# Patient Record
Sex: MALE | Race: OTHER | Employment: FULL TIME | URBAN - METROPOLITAN AREA
[De-identification: names, ages, dates, MRNs, and addresses within clinical notes are randomized per-mention and may not be internally consistent; named-entity substitution may affect disease eponyms.]

---

## 2021-08-10 ENCOUNTER — HOSPITAL ENCOUNTER (EMERGENCY)
Age: 61
Discharge: HOME | End: 2021-08-10
Attending: STUDENT IN AN ORGANIZED HEALTH CARE EDUCATION/TRAINING PROGRAM
Payer: COMMERCIAL

## 2021-08-10 VITALS
HEART RATE: 62 BPM | TEMPERATURE: 97.9 F | OXYGEN SATURATION: 97 % | RESPIRATION RATE: 22 BRPM | SYSTOLIC BLOOD PRESSURE: 124 MMHG | DIASTOLIC BLOOD PRESSURE: 64 MMHG

## 2021-08-10 DIAGNOSIS — N41.0 ACUTE PROSTATITIS: Primary | ICD-10-CM

## 2021-08-10 LAB
ALBUMIN SERPL-MCNC: 3.4 G/DL (ref 3.2–4.6)
ALBUMIN/GLOB SERPL: 0.7 {RATIO} (ref 1.2–3.5)
ALP SERPL-CCNC: 82 U/L (ref 50–136)
ALT SERPL-CCNC: 60 U/L (ref 12–65)
ANION GAP SERPL CALC-SCNC: 9 MMOL/L (ref 7–16)
APPEARANCE UR: CLEAR
AST SERPL-CCNC: 17 U/L (ref 15–37)
BASOPHILS # BLD: 0.1 K/UL (ref 0–0.2)
BASOPHILS NFR BLD: 0 % (ref 0–2)
BILIRUB SERPL-MCNC: 0.6 MG/DL (ref 0.2–1.1)
BILIRUB UR QL: NEGATIVE
BUN SERPL-MCNC: 12 MG/DL (ref 8–23)
CALCIUM SERPL-MCNC: 9.5 MG/DL (ref 8.3–10.4)
CHLORIDE SERPL-SCNC: 101 MMOL/L (ref 98–107)
CO2 SERPL-SCNC: 24 MMOL/L (ref 21–32)
COLOR UR: YELLOW
CREAT SERPL-MCNC: 0.79 MG/DL (ref 0.8–1.5)
DIFFERENTIAL METHOD BLD: ABNORMAL
EOSINOPHIL # BLD: 0.1 K/UL (ref 0–0.8)
EOSINOPHIL NFR BLD: 1 % (ref 0.5–7.8)
ERYTHROCYTE [DISTWIDTH] IN BLOOD BY AUTOMATED COUNT: 14 % (ref 11.9–14.6)
GLOBULIN SER CALC-MCNC: 4.7 G/DL (ref 2.3–3.5)
GLUCOSE SERPL-MCNC: 154 MG/DL (ref 65–100)
GLUCOSE UR STRIP.AUTO-MCNC: >1000 MG/DL
HCT VFR BLD AUTO: 40.4 % (ref 41.1–50.3)
HGB BLD-MCNC: 13.4 G/DL (ref 13.6–17.2)
HGB UR QL STRIP: NEGATIVE
IMM GRANULOCYTES # BLD AUTO: 0.2 K/UL (ref 0–0.5)
IMM GRANULOCYTES NFR BLD AUTO: 1 % (ref 0–5)
KETONES UR QL STRIP.AUTO: NEGATIVE MG/DL
LEUKOCYTE ESTERASE UR QL STRIP.AUTO: NEGATIVE
LYMPHOCYTES # BLD: 1.8 K/UL (ref 0.5–4.6)
LYMPHOCYTES NFR BLD: 11 % (ref 13–44)
MCH RBC QN AUTO: 27.3 PG (ref 26.1–32.9)
MCHC RBC AUTO-ENTMCNC: 33.2 G/DL (ref 31.4–35)
MCV RBC AUTO: 82.4 FL (ref 79.6–97.8)
MONOCYTES # BLD: 1.1 K/UL (ref 0.1–1.3)
MONOCYTES NFR BLD: 7 % (ref 4–12)
NEUTS SEG # BLD: 12.2 K/UL (ref 1.7–8.2)
NEUTS SEG NFR BLD: 80 % (ref 43–78)
NITRITE UR QL STRIP.AUTO: NEGATIVE
NRBC # BLD: 0 K/UL (ref 0–0.2)
PH UR STRIP: 5.5 [PH] (ref 5–9)
PLATELET # BLD AUTO: 430 K/UL (ref 150–450)
PMV BLD AUTO: 8.9 FL (ref 9.4–12.3)
POTASSIUM SERPL-SCNC: 3.8 MMOL/L (ref 3.5–5.1)
PROT SERPL-MCNC: 8.1 G/DL (ref 6.3–8.2)
PROT UR STRIP-MCNC: NEGATIVE MG/DL
RBC # BLD AUTO: 4.9 M/UL (ref 4.23–5.6)
SODIUM SERPL-SCNC: 134 MMOL/L (ref 136–145)
SP GR UR REFRACTOMETRY: 1.03 (ref 1–1.02)
UROBILINOGEN UR QL STRIP.AUTO: 0.2 EU/DL (ref 0.2–1)
WBC # BLD AUTO: 15.3 K/UL (ref 4.3–11.1)

## 2021-08-10 PROCEDURE — 74011000250 HC RX REV CODE- 250

## 2021-08-10 PROCEDURE — 80053 COMPREHEN METABOLIC PANEL: CPT

## 2021-08-10 PROCEDURE — 85025 COMPLETE CBC W/AUTO DIFF WBC: CPT

## 2021-08-10 PROCEDURE — 81003 URINALYSIS AUTO W/O SCOPE: CPT

## 2021-08-10 PROCEDURE — 99284 EMERGENCY DEPT VISIT MOD MDM: CPT

## 2021-08-10 RX ORDER — LIDOCAINE HYDROCHLORIDE 20 MG/ML
JELLY TOPICAL
Status: COMPLETED
Start: 2021-08-10 | End: 2021-08-10

## 2021-08-10 RX ADMIN — LIDOCAINE HYDROCHLORIDE: 20 JELLY TOPICAL at 20:32

## 2021-08-10 NOTE — ED TRIAGE NOTES
Pt sent from md [facility] they were not able to pass the hannon and pt is only able to dribble urine and is in pain currently

## 2021-08-11 NOTE — ED PROVIDER NOTES
56-year-old male presents to the emergency department being sent here by emergencyMD for difficulty urinating. Paperwork from emergency MD was reviewed. Patient was seen earlier today, initial complaining of difficulty urinating for the past 1 week. Patient had Martini catheter placed which only produced approximately 5 cc of urine. CT scan patient's abdomen pelvis showed prostatitis, given IV Rocephin and placed on Cipro for 4 weeks. Patient returned to emergency MD later today with worsening pain, at that time he was advised to go directly to the emergency department. Patient ports continued pain and discomfort to his abdomen. Reports normal bowel movements, denies nausea, vomiting diarrhea. Denies fever or chills. Past Medical History:   Diagnosis Date    Family history of malignant neoplasm of gastrointestinal tract     aunt colon cancer age 80    Mixed hyperlipidemia     Personal history of colonic polyps 2015    adenomas    UTI (urinary tract infection) 1/2014       Past Surgical History:   Procedure Laterality Date    HX COLONOSCOPY  2009    Normal. Done in [place].  HX COLONOSCOPY  last 9/18/15    Carlo--cecal and trans TAs--5 year recall         Family History:   Problem Relation Age of Onset    High Cholesterol Maternal Uncle     High Cholesterol Mother        Social History     Socioeconomic History    Marital status:      Spouse name: Not on file    Number of children: Not on file    Years of education: Not on file    Highest education level: Not on file   Occupational History    Not on file   Tobacco Use    Smoking status: Never Smoker    Smokeless tobacco: Never Used   Substance and Sexual Activity    Alcohol use: Yes     Alcohol/week: 1.7 standard drinks     Types: 2 Cans of beer per week     Comment: Moderate - 2 beers and couple glasses of wine per week.     Drug use: No    Sexual activity: Not on file   Other Topics Concern    Not on file Social History Narrative    Not on file     Social Determinants of Health     Financial Resource Strain:     Difficulty of Paying Living Expenses:    Food Insecurity:     Worried About Running Out of Food in the Last Year:     [address] in the Last Year:    Transportation Needs:     Lack of Transportation (Medical):  Lack of Transportation (Non-Medical):    Physical Activity:     Days of Exercise per Week:     Minutes of Exercise per Session:    Stress:     Feeling of Stress :    Social Connections:     Frequency of Communication with Friends and Family:     Frequency of Social Gatherings with Friends and Family:     Attends [employer] Services:     Active Member of Clubs or Organizations:     Attends Club or Organization Meetings:     Marital Status:    Intimate Partner Violence:     Fear of Current or Ex-Partner:     Emotionally Abused:     Physically Abused:     Sexually Abused: ALLERGIES: Patient has no known allergies. Review of Systems   Constitutional: Negative for chills and fever. HENT: Negative for congestion and sore throat. Eyes: Negative for visual disturbance. Respiratory: Negative for cough and shortness of breath. Cardiovascular: Negative for chest pain. Gastrointestinal: Positive for abdominal distention and abdominal pain. Negative for diarrhea, nausea and vomiting. Endocrine: Negative for polyuria. Genitourinary: Positive for difficulty urinating and dysuria. Musculoskeletal: Negative for neck pain and neck stiffness. Skin: Negative for rash. Neurological: Negative for weakness and headaches. All other systems reviewed and are negative. Vitals:    08/10/21 [birth year]   BP: (!) 184/98    Pulse: 82    Resp:  22   Temp:  97.9 °F (36.6 °C)   SpO2: 97%             Physical Exam  Vitals and nursing note reviewed. Constitutional:       General: He is in acute distress. Appearance: Normal appearance.    HENT:      Head: Normocephalic and atraumatic. Nose: Nose normal.      Mouth/Throat:      Mouth: Mucous membranes are moist.   Eyes:      Extraocular Movements: Extraocular movements intact. Cardiovascular:      Rate and Rhythm: Normal rate and regular rhythm. Heart sounds: Normal heart sounds. Pulmonary:      Effort: Pulmonary effort is normal.      Breath sounds: Normal breath sounds. No wheezing, rhonchi or rales. Abdominal:      General: Abdomen is flat. There is distension. Palpations: Abdomen is soft. Tenderness: There is abdominal tenderness. There is no guarding. Comments: Tenderness greatest in the suprapubic region. Musculoskeletal:         General: Normal range of motion. Cervical back: Normal range of motion. Skin:     General: Skin is warm and dry. Neurological:      General: No focal deficit present. Mental Status: He is alert and oriented to person, place, and time. Psychiatric:         Mood and Affect: Mood normal.          MDM  Number of Diagnoses or Management Options  Acute prostatitis  Diagnosis management comments: 76-year-old male presents to the emergency department being seen twice today at local urgent care. Patient was given IV Rocephin as well as a prescription for Cipro. Diagnosed via CT scan with prostatitis. Patient with difficulty urinating complaint of continued lower abdominal discomfort, will place Martini catheter here in the emergency department. Bladder scan shows greater than 200 cc but patient unable to produce urine. Martini catheter successfully placed by nursing staff, afterwards patient with complete resolution of symptoms. Urinalysis shows no evidence of urinary tract infection. Labs resulted showing white count 15.3, stable H&H, normal electrolytes, normal kidney function, normal liver enzymes. We will leave Martini catheter in place given his continued requirement of catheter in place to urinate.   Patient will take Flomax as well as ciprofloxacin as previously prescribed by emergency MD.  Patient given urology follow-up and call to call tomorrow to make appointment. Patient given strict return precautions. He voiced understanding and agreement with this plan.        Amount and/or Complexity of Data Reviewed  Clinical lab tests: ordered and reviewed  Tests in the radiology section of CPT®: reviewed and ordered  Decide to obtain previous medical records or to obtain history from someone other than the patient: yes  Review and summarize past medical records: yes    Risk of Complications, Morbidity, and/or Mortality  Presenting problems: moderate  Diagnostic procedures: moderate  Management options: moderate           Procedures

## 2021-08-11 NOTE — DISCHARGE INSTRUCTIONS
Leave Martini catheter in place until seen by urology. Take Cipro as previously prescribed, also take Flomax. Return to the ER for worsening or worrisome symptoms.

## 2021-08-11 NOTE — ED NOTES
I have reviewed discharge instructions with the patient. The patient verbalized understanding. Patient left ED via Discharge Method: ambulatory to Home with spouse. Opportunity for questions and clarification provided. Patient given 0 scripts. To continue your aftercare when you leave the hospital, you may receive an automated call from our care team to check in on how you are doing. This is a free service and part of our promise to provide the best care and service to meet your aftercare needs.  If you have questions, or wish to unsubscribe from this service please call [phone]. Thank you for Choosing our [address] [place] [address] Emergency Department.

## 2021-08-12 ENCOUNTER — HOSPITAL ENCOUNTER (EMERGENCY)
Age: 61
Discharge: HOME | End: 2021-08-12
Attending: EMERGENCY MEDICINE
Payer: COMMERCIAL

## 2021-08-12 VITALS
WEIGHT: 170 LBS | HEART RATE: 80 BPM | DIASTOLIC BLOOD PRESSURE: 77 MMHG | OXYGEN SATURATION: 97 % | SYSTOLIC BLOOD PRESSURE: 132 MMHG | BODY MASS INDEX: 26.68 KG/M2 | TEMPERATURE: 98 F | HEIGHT: 67 IN | RESPIRATION RATE: 16 BRPM

## 2021-08-12 DIAGNOSIS — N41.0 ACUTE PROSTATITIS: Primary | ICD-10-CM

## 2021-08-12 DIAGNOSIS — T83.9XXA FOLEY CATHETER PROBLEM, INITIAL ENCOUNTER (HCC): ICD-10-CM

## 2021-08-12 PROCEDURE — 99282 EMERGENCY DEPT VISIT SF MDM: CPT

## 2021-08-12 RX ORDER — HYDROCODONE BITARTRATE AND ACETAMINOPHEN 5; 325 MG/1; MG/1
1-2 TABLET ORAL
Qty: 19 TABLET | Refills: 0 | Status: SHIPPED | OUTPATIENT
Start: 2021-08-12 | End: 2021-08-17

## 2021-08-12 RX ORDER — SULFAMETHOXAZOLE AND TRIMETHOPRIM 800; 160 MG/1; MG/1
TABLET ORAL 2 TIMES DAILY
COMMUNITY
Start: 2021-08-09 | End: 2021-08-20

## 2021-08-12 RX ORDER — DOCUSATE SODIUM 100 MG/1
100 CAPSULE, LIQUID FILLED ORAL 2 TIMES DAILY
Qty: 60 CAPSULE | Refills: 0 | Status: SHIPPED | OUTPATIENT
Start: 2021-08-12 | End: 2021-08-12 | Stop reason: SDUPTHER

## 2021-08-12 RX ORDER — TOLTERODINE TARTRATE 2 MG/1
2 TABLET, EXTENDED RELEASE ORAL DAILY
COMMUNITY
Start: 2021-01-27

## 2021-08-12 RX ORDER — TRIAMCINOLONE ACETONIDE 1 MG/G
CREAM TOPICAL 2 TIMES DAILY
COMMUNITY
Start: 2021-07-11 | End: 2022-02-17

## 2021-08-12 RX ORDER — TAMSULOSIN HYDROCHLORIDE 0.4 MG/1
CAPSULE ORAL
COMMUNITY
Start: 2021-08-10 | End: 2021-09-01 | Stop reason: SDUPTHER

## 2021-08-12 RX ORDER — SILDENAFIL 100 MG/1
100 TABLET, FILM COATED ORAL AS NEEDED
COMMUNITY
End: 2022-02-15

## 2021-08-12 RX ORDER — HYDROCODONE BITARTRATE AND ACETAMINOPHEN 5; 325 MG/1; MG/1
1-2 TABLET ORAL
Qty: 19 TABLET | Refills: 0 | Status: SHIPPED | OUTPATIENT
Start: 2021-08-12 | End: 2021-08-12 | Stop reason: SDUPTHER

## 2021-08-12 RX ORDER — DOCUSATE SODIUM 100 MG/1
100 CAPSULE, LIQUID FILLED ORAL 2 TIMES DAILY
Qty: 60 CAPSULE | Refills: 0 | Status: SHIPPED | OUTPATIENT
Start: 2021-08-12 | End: 2021-09-11

## 2021-08-12 NOTE — ED TRIAGE NOTES
Pt in states he was seen 2 days ago and had catheter placed for urinary retention. States his catheter is not draining well and he is having purulent discharge from penis. States lower abdominal pain denies n/v/d/fever. States called urology and was told to go to ed.

## 2021-08-12 NOTE — DISCHARGE INSTRUCTIONS
Continue ciprofloxacin  Continue Flomax  Take pain medication as needed  Do not drink alcohol or drive while taking the prescription pain medications  Take stool softener as prescribed  Call the urologist in the morning to follow-up  Return to ER for any worsening symptoms or new problems which may arise

## 2021-08-12 NOTE — ED NOTES
56year old male who presents for bladder pressure, decreased UOP, and purulent drainage from around urinary catheter. Catheter placed 8/10/2021 due to urinary retention. VSS in triage. Patient appears uncomfortable but is in no acute distress. Patient evaluated initially in triage. Rapid Medical Evaluation was conducted and necessary orders have been placed. I have performed a medical screening exam.  Care will now be transferred to the attending physician in the emergency department.   Edie Garcia NP 4:04 PM

## 2021-08-12 NOTE — ED NOTES
I have reviewed discharge instructions with the patient. The patient verbalized understanding. Patient left ED via Discharge Method: ambulatory to Home by self. Opportunity for questions and clarification provided. Patient given 2 scripts. To continue your aftercare when you leave the hospital, you may receive an automated call from our care team to check in on how you are doing. This is a free service and part of our promise to provide the best care and service to meet your aftercare needs.  If you have questions, or wish to unsubscribe from this service please call [phone]. Thank you for Choosing our [facility] Emergency Department.      2

## 2021-08-16 ENCOUNTER — HOSPITAL ENCOUNTER (EMERGENCY)
Age: 61
Discharge: HOME | End: 2021-08-16
Attending: EMERGENCY MEDICINE
Payer: COMMERCIAL

## 2021-08-16 VITALS
BODY MASS INDEX: 29.02 KG/M2 | TEMPERATURE: 98.6 F | WEIGHT: 170 LBS | SYSTOLIC BLOOD PRESSURE: 132 MMHG | HEIGHT: 64 IN | RESPIRATION RATE: 18 BRPM | HEART RATE: 61 BPM | OXYGEN SATURATION: 95 % | DIASTOLIC BLOOD PRESSURE: 63 MMHG

## 2021-08-16 DIAGNOSIS — T83.011A MALFUNCTION OF FOLEY CATHETER, INITIAL ENCOUNTER (HCC): Primary | ICD-10-CM

## 2021-08-16 LAB
BACTERIA URNS QL MICRO: NORMAL /HPF
CASTS URNS QL MICRO: NORMAL /LPF
EPI CELLS #/AREA URNS HPF: NORMAL /HPF
RBC #/AREA URNS HPF: NORMAL /HPF
WBC URNS QL MICRO: NORMAL /HPF

## 2021-08-16 PROCEDURE — 87086 URINE CULTURE/COLONY COUNT: CPT

## 2021-08-16 PROCEDURE — 81003 URINALYSIS AUTO W/O SCOPE: CPT

## 2021-08-16 PROCEDURE — 51702 INSERT TEMP BLADDER CATH: CPT

## 2021-08-16 PROCEDURE — 81015 MICROSCOPIC EXAM OF URINE: CPT

## 2021-08-16 PROCEDURE — 96372 THER/PROPH/DIAG INJ SC/IM: CPT

## 2021-08-16 PROCEDURE — 74011000250 HC RX REV CODE- 250: Performed by: EMERGENCY MEDICINE

## 2021-08-16 PROCEDURE — 99284 EMERGENCY DEPT VISIT MOD MDM: CPT

## 2021-08-16 PROCEDURE — 74011250636 HC RX REV CODE- 250/636: Performed by: EMERGENCY MEDICINE

## 2021-08-16 RX ORDER — TRAMADOL HYDROCHLORIDE 50 MG/1
50 TABLET ORAL
Qty: 11 TABLET | Refills: 0 | Status: SHIPPED | OUTPATIENT
Start: 2021-08-16 | End: 2021-08-20

## 2021-08-16 RX ORDER — CEFDINIR 300 MG/1
300 CAPSULE ORAL 2 TIMES DAILY
Qty: 14 CAPSULE | Refills: 0 | Status: SHIPPED | OUTPATIENT
Start: 2021-08-16 | End: 2021-08-23

## 2021-08-16 RX ORDER — LIDOCAINE HYDROCHLORIDE 20 MG/ML
JELLY TOPICAL ONCE
Status: COMPLETED | OUTPATIENT
Start: 2021-08-16 | End: 2021-08-16

## 2021-08-16 RX ORDER — PHENAZOPYRIDINE HYDROCHLORIDE 95 MG/1
95 TABLET ORAL
Qty: 9 TABLET | Refills: 0 | Status: SHIPPED | OUTPATIENT
Start: 2021-08-16 | End: 2021-08-20

## 2021-08-16 RX ORDER — MORPHINE SULFATE 4 MG/ML
4 INJECTION INTRAVENOUS
Status: COMPLETED | OUTPATIENT
Start: 2021-08-16 | End: 2021-08-16

## 2021-08-16 RX ADMIN — MORPHINE SULFATE 4 MG: 4 INJECTION INTRAVENOUS at 14:14

## 2021-08-16 RX ADMIN — LIDOCAINE HYDROCHLORIDE: 20 JELLY TOPICAL at 14:01

## 2021-08-16 NOTE — ED NOTES
Saline guaze placed around hannon and tip of penis x 10 minutes then lido-Uro-jet applied to the tip per verbal order Dr Li Pacheco. Hannon cath removed per Dr Li Pacheco.

## 2021-08-16 NOTE — ED PROVIDER NOTES
57-year-old gentleman presents with concerns about pain related to his indwelling Martini catheter. He has had it for several days. He says it seems to have some pus and be more painful than usual.  He denies any specific fevers or chills. He said no nausea, vomiting, or diarrhea. He was diagnosed with a prostatitis according to a CT scan at an outside facility about 16 days ago. He continued to have difficulty urinating and the catheter was placed around August 10. August 12 he was seen and had his catheter adjusted with no significant urinary retention. He does have an appointment with a urologist on August 20. Elements of this note were created using speech recognition software. As such, errors of speech recognition may be present. Past Medical History:   Diagnosis Date    Family history of malignant neoplasm of gastrointestinal tract     aunt colon cancer age 80    Mixed hyperlipidemia     Personal history of colonic polyps 2015    adenomas    UTI (urinary tract infection) 1/2014       Past Surgical History:   Procedure Laterality Date    HX COLONOSCOPY  2009    Normal. Done in [place].  HX COLONOSCOPY  last 9/18/15    Carlo--cecal and trans TAs--5 year recall         Family History:   Problem Relation Age of Onset    High Cholesterol Maternal Uncle     High Cholesterol Mother        Social History     Socioeconomic History    Marital status:      Spouse name: Not on file    Number of children: Not on file    Years of education: Not on file    Highest education level: Not on file   Occupational History    Not on file   Tobacco Use    Smoking status: Never Smoker    Smokeless tobacco: Never Used   Substance and Sexual Activity    Alcohol use: Yes     Alcohol/week: 1.7 standard drinks     Types: 2 Cans of beer per week     Comment: Moderate - 2 beers and couple glasses of wine per week.     Drug use: No    Sexual activity: Not on file   Other Topics Concern  Not on file   Social History Narrative    Not on file     Social Determinants of Health     Financial Resource Strain:     Difficulty of Paying Living Expenses:    Food Insecurity:     Worried About Running Out of Food in the Last Year:     [address] in the Last Year:    Transportation Needs:     Lack of Transportation (Medical):  Lack of Transportation (Non-Medical):    Physical Activity:     Days of Exercise per Week:     Minutes of Exercise per Session:    Stress:     Feeling of Stress :    Social Connections:     Frequency of Communication with Friends and Family:     Frequency of Social Gatherings with Friends and Family:     Attends Buddhism Services:     Active Member of Clubs or Organizations:     Attends Club or Organization Meetings:     Marital Status:    Intimate Partner Violence:     Fear of Current or Ex-Partner:     Emotionally Abused:     Physically Abused:     Sexually Abused: ALLERGIES: Patient has no known allergies. Review of Systems   Constitutional: Negative for chills and fever. Respiratory: Negative for cough and shortness of breath. Gastrointestinal: Negative for diarrhea, nausea and vomiting. Genitourinary: Positive for discharge and penile pain. Negative for penile swelling and scrotal swelling. Vitals:    08/16/21 1142   BP: (!) 153/86   Pulse: 79   Resp: 22   Temp: 98.6 °F (37 °C)   SpO2: 98%   Weight: 77.1 kg (170 lb)   Height: 5' 4\" (1.626 m)            Physical Exam  Vitals and nursing note reviewed. Constitutional:       Appearance: Normal appearance. Abdominal:      General: Bowel sounds are normal.      Palpations: Abdomen is soft. Genitourinary:     Comments: Uncircumcised male. I was able to retract the foreskin. The glans does seem to be a little bit erythematous. The catheter appears to be sticking right near the urethral meatus and he is experiencing severe pain when I attempt to remove it.   Neurological: Mental Status: He is alert. MDM  Number of Diagnoses or Management Options  Diagnosis management comments: Nurse attempted to remove the catheter in the tip of the catheter appears to be sticking at the urethral meatus. He is having pain when that was attempted to be removed. I will attempt to soak the glans and the catheter with some sterile saline to dislodge any material that may be collected there we will place some lidocaine jelly around the urethral meatus to see if we can decrease some of the pain and then try to gently dislodge the catheter. ED Course as of Aug 16 1844   Mon Aug 16, 2021   1309 Patient continues to have pain with attempted removal of the catheter. I will discuss the case with urology. [AC]   2056 I was able to successfully remove the catheter. I then was able to place a coudé Martini with clear urine drainage. I will switch his antibiotics to DORAN YARITZA for potentially broader coverage and I will send for a urine culture.    [AC]      ED Course User Index  [AC] Manisha Wills MD       Other Procedure    Date/Time: 8/16/2021 6:44 PM  Performed by: Manisha Wills MD  Authorized by: Manisha Wills MD     Consent:     Consent obtained:  Verbal    Consent given by:  Patient    Risks discussed:  Pain and bleeding    Alternatives discussed:  Delayed treatment  Indications:     Indications:  Urinary retention  Pre-procedure details:     Skin preparation:  Betadine    Preparation: Patient was prepped and draped in the usual sterile fashion    Post-procedure details:     Patient tolerance of procedure:   Tolerated well, no immediate complications  Comments:      Cudet Martini catheter was placed with usual sterile technique

## 2021-08-16 NOTE — ED NOTES
White drainage noted around the penis. Pt c/o pain. Attempted to remove catheter after saline was removed. Unable to remove the catheter. Dr Whit Fernández made aware.

## 2021-08-16 NOTE — ED NOTES
I have reviewed discharge instructions with the patient and spouse. The patient and spouse verbalized understanding. Patient left ED via Discharge Method: ambulatory to Home with (wife). Opportunity for questions and clarification provided. Catheter care and leg bag use instruction given      Patient given 0 scripts. To continue your aftercare when you leave the hospital, you may receive an automated call from our care team to check in on how you are doing. This is a free service and part of our promise to provide the best care and service to meet your aftercare needs.  If you have questions, or wish to unsubscribe from this service please call [phone]. Thank you for Choosing our [facility] Emergency Department.

## 2021-08-16 NOTE — DISCHARGE INSTRUCTIONS
Keep the area clean with soap and water twice daily. Please return to the emergency room with any fevers, vomiting, no urine drainage, worsening symptoms, or additional concerns. Follow-up with your urologist as planned.

## 2021-08-19 LAB
BACTERIA SPEC CULT: NORMAL
SERVICE CMNT-IMP: NORMAL

## 2022-05-10 DIAGNOSIS — E78.2 MIXED HYPERLIPIDEMIA: ICD-10-CM

## 2022-05-10 DIAGNOSIS — R73.01 IMPAIRED FASTING GLUCOSE: Primary | ICD-10-CM

## 2022-07-08 DIAGNOSIS — N40.1 BENIGN PROSTATIC HYPERPLASIA WITH LOWER URINARY TRACT SYMPTOMS, SYMPTOM DETAILS UNSPECIFIED: Primary | ICD-10-CM

## 2022-07-08 RX ORDER — TAMSULOSIN HYDROCHLORIDE 0.4 MG/1
0.4 CAPSULE ORAL DAILY
Qty: 90 CAPSULE | Refills: 3 | Status: SHIPPED | OUTPATIENT
Start: 2022-07-08